# Patient Record
Sex: FEMALE | Race: WHITE | URBAN - METROPOLITAN AREA
[De-identification: names, ages, dates, MRNs, and addresses within clinical notes are randomized per-mention and may not be internally consistent; named-entity substitution may affect disease eponyms.]

---

## 2018-05-26 ENCOUNTER — EMERGENCY (EMERGENCY)
Facility: HOSPITAL | Age: 83
LOS: 1 days | Discharge: HOME | End: 2018-05-26
Attending: EMERGENCY MEDICINE
Payer: COMMERCIAL

## 2018-05-26 VITALS
RESPIRATION RATE: 18 BRPM | SYSTOLIC BLOOD PRESSURE: 120 MMHG | HEART RATE: 96 BPM | TEMPERATURE: 98 F | OXYGEN SATURATION: 98 % | DIASTOLIC BLOOD PRESSURE: 77 MMHG

## 2018-05-26 VITALS
SYSTOLIC BLOOD PRESSURE: 113 MMHG | OXYGEN SATURATION: 98 % | TEMPERATURE: 98 F | RESPIRATION RATE: 17 BRPM | HEART RATE: 94 BPM | WEIGHT: 134.92 LBS | HEIGHT: 62 IN | DIASTOLIC BLOOD PRESSURE: 66 MMHG

## 2018-05-26 LAB
ALBUMIN SERPL ELPH-MCNC: 3.4 G/DL — LOW (ref 3.5–5)
ALP SERPL-CCNC: 125 U/L — HIGH (ref 40–120)
ALT FLD-CCNC: 22 U/L DA — SIGNIFICANT CHANGE UP (ref 10–60)
ANION GAP SERPL CALC-SCNC: 7 MMOL/L — SIGNIFICANT CHANGE UP (ref 5–17)
AST SERPL-CCNC: 39 U/L — SIGNIFICANT CHANGE UP (ref 10–40)
BASE EXCESS BLDV CALC-SCNC: 2.3 MMOL/L — HIGH (ref -2–2)
BASOPHILS # BLD AUTO: 0.2 K/UL — SIGNIFICANT CHANGE UP (ref 0–0.2)
BASOPHILS NFR BLD AUTO: 1.2 % — SIGNIFICANT CHANGE UP (ref 0–2)
BILIRUB SERPL-MCNC: 0.2 MG/DL — SIGNIFICANT CHANGE UP (ref 0.2–1.2)
BUN SERPL-MCNC: 22 MG/DL — HIGH (ref 7–18)
CALCIUM SERPL-MCNC: 9.8 MG/DL — SIGNIFICANT CHANGE UP (ref 8.4–10.5)
CHLORIDE SERPL-SCNC: 104 MMOL/L — SIGNIFICANT CHANGE UP (ref 96–108)
CO2 SERPL-SCNC: 25 MMOL/L — SIGNIFICANT CHANGE UP (ref 22–31)
CREAT SERPL-MCNC: 2.08 MG/DL — HIGH (ref 0.5–1.3)
EOSINOPHIL # BLD AUTO: 0.1 K/UL — SIGNIFICANT CHANGE UP (ref 0–0.5)
EOSINOPHIL NFR BLD AUTO: 0.6 % — SIGNIFICANT CHANGE UP (ref 0–6)
GLUCOSE SERPL-MCNC: 268 MG/DL — HIGH (ref 70–99)
HCO3 BLDV-SCNC: 27 MMOL/L — SIGNIFICANT CHANGE UP (ref 21–29)
HCT VFR BLD CALC: 35.2 % — SIGNIFICANT CHANGE UP (ref 34.5–45)
HGB BLD-MCNC: 11.3 G/DL — LOW (ref 11.5–15.5)
HOROWITZ INDEX BLDV+IHG-RTO: 21 — SIGNIFICANT CHANGE UP
LACTATE SERPL-SCNC: 1.3 MMOL/L — SIGNIFICANT CHANGE UP (ref 0.7–2)
LIDOCAIN IGE QN: 250 U/L — SIGNIFICANT CHANGE UP (ref 73–393)
LYMPHOCYTES # BLD AUTO: 30.3 % — SIGNIFICANT CHANGE UP (ref 13–44)
LYMPHOCYTES # BLD AUTO: 4.1 K/UL — HIGH (ref 1–3.3)
MCHC RBC-ENTMCNC: 28.4 PG — SIGNIFICANT CHANGE UP (ref 27–34)
MCHC RBC-ENTMCNC: 32.2 GM/DL — SIGNIFICANT CHANGE UP (ref 32–36)
MCV RBC AUTO: 88.2 FL — SIGNIFICANT CHANGE UP (ref 80–100)
MONOCYTES # BLD AUTO: 0.9 K/UL — SIGNIFICANT CHANGE UP (ref 0–0.9)
MONOCYTES NFR BLD AUTO: 6.8 % — SIGNIFICANT CHANGE UP (ref 2–14)
NEUTROPHILS # BLD AUTO: 8.3 K/UL — HIGH (ref 1.8–7.4)
NEUTROPHILS NFR BLD AUTO: 61 % — SIGNIFICANT CHANGE UP (ref 43–77)
PCO2 BLDV: 46 MMHG — SIGNIFICANT CHANGE UP (ref 35–50)
PH BLDV: 7.39 — SIGNIFICANT CHANGE UP (ref 7.35–7.45)
PLATELET # BLD AUTO: 140 K/UL — LOW (ref 150–400)
PO2 BLDV: SIGNIFICANT CHANGE UP MMHG (ref 25–45)
POTASSIUM SERPL-MCNC: 5.1 MMOL/L — SIGNIFICANT CHANGE UP (ref 3.5–5.3)
POTASSIUM SERPL-SCNC: 5.1 MMOL/L — SIGNIFICANT CHANGE UP (ref 3.5–5.3)
PROT SERPL-MCNC: 8 G/DL — SIGNIFICANT CHANGE UP (ref 6–8.3)
RBC # BLD: 3.99 M/UL — SIGNIFICANT CHANGE UP (ref 3.8–5.2)
RBC # FLD: 12.8 % — SIGNIFICANT CHANGE UP (ref 10.3–14.5)
SAO2 % BLDV: 61 % — LOW (ref 67–88)
SODIUM SERPL-SCNC: 136 MMOL/L — SIGNIFICANT CHANGE UP (ref 135–145)
WBC # BLD: 13.6 K/UL — HIGH (ref 3.8–10.5)
WBC # FLD AUTO: 13.6 K/UL — HIGH (ref 3.8–10.5)

## 2018-05-26 PROCEDURE — 82803 BLOOD GASES ANY COMBINATION: CPT

## 2018-05-26 PROCEDURE — 83605 ASSAY OF LACTIC ACID: CPT

## 2018-05-26 PROCEDURE — 99284 EMERGENCY DEPT VISIT MOD MDM: CPT

## 2018-05-26 PROCEDURE — 82962 GLUCOSE BLOOD TEST: CPT

## 2018-05-26 PROCEDURE — 85027 COMPLETE CBC AUTOMATED: CPT

## 2018-05-26 PROCEDURE — 83690 ASSAY OF LIPASE: CPT

## 2018-05-26 PROCEDURE — 80053 COMPREHEN METABOLIC PANEL: CPT

## 2018-05-26 RX ORDER — SODIUM CHLORIDE 9 MG/ML
1000 INJECTION INTRAMUSCULAR; INTRAVENOUS; SUBCUTANEOUS ONCE
Qty: 0 | Refills: 0 | Status: DISCONTINUED | OUTPATIENT
Start: 2018-05-26 | End: 2018-05-30

## 2018-05-26 NOTE — ED PROVIDER NOTE - PROGRESS NOTE DETAILS
Discussed with pt results of work up, strict return precautions, and need for follow up.  Pt expressed understanding and agrees with plan.    Well appearing, no anion gap, acidosis, unable to provide urine but no dysuria, son with pt at home able to administer meds, fu with Dr. Willams Discussed with pt results of work up, strict return precautions, and need for follow up.  Pt expressed understanding and agrees with plan.    Well appearing, no anion gap, acidosis, unable to provide urine but no dysuria, son with pt at home able to administer meds, advised importance w compliance, states can fu with Dr. Willams

## 2018-05-26 NOTE — ED PROVIDER NOTE - MEDICAL DECISION MAKING DETAILS
87 y/o F pt with hyperglycemia, decreased PO intake and non compliant with meds. Will obtain labs and reevaluate.

## 2018-05-26 NOTE — ED PROVIDER NOTE - OBJECTIVE STATEMENT
: [id number]  85 y/o F pt with a PMHx of DM, HTN and dementia and no significant PSHx presents to the ED c/o weakness and loss of appetite. Pt's daughter states that yesterday pt's son reported pt's blood glucose level of 500. Pt's daughter reports they tried to give her meds 5x yesterday but pt refused it; she is taking insulin but not the pills.  Pt is on metformin 100 BID and Seroquel. Pt denies vomiting,     diarrhea, chest pain or any other complaints. NKDA. : [id number]  85 y/o F pt with a PMHx of DM, HTN and dementia and no significant PSHx presents to the ED c/o weakness and loss of appetite. Pt's aide states that yesterday pt's son reported pt's blood glucose level of 500. Pt's aide reports they tried to give her meds 5x yesterday but pt refused it; she is taking insulin but not the pills.  Pt is on metformin 100 BID and Seroquel. Pt denies vomiting, diarrhea, chest pain or any other complaints. NKDA.

## 2018-05-26 NOTE — ED ADULT NURSE NOTE - OBJECTIVE STATEMENT
n w/c with family for high sugar and loss of appetite as per family refuse to take Metformin at home, pt vital signs stable,

## 2018-05-26 NOTE — ED ADULT TRIAGE NOTE - CHIEF COMPLAINT QUOTE
on w/c with family for high sugar and loss of appetite as per family refuse to take Metformin at home

## 2019-05-17 ENCOUNTER — EMERGENCY (EMERGENCY)
Facility: HOSPITAL | Age: 84
LOS: 1 days | Discharge: HOME | End: 2019-05-17
Attending: EMERGENCY MEDICINE
Payer: COMMERCIAL

## 2019-05-17 VITALS
SYSTOLIC BLOOD PRESSURE: 137 MMHG | DIASTOLIC BLOOD PRESSURE: 74 MMHG | OXYGEN SATURATION: 99 % | TEMPERATURE: 98 F | RESPIRATION RATE: 18 BRPM | HEART RATE: 81 BPM

## 2019-05-17 VITALS
TEMPERATURE: 99 F | HEART RATE: 77 BPM | OXYGEN SATURATION: 98 % | HEIGHT: 62 IN | DIASTOLIC BLOOD PRESSURE: 78 MMHG | RESPIRATION RATE: 16 BRPM | SYSTOLIC BLOOD PRESSURE: 132 MMHG | WEIGHT: 145.06 LBS

## 2019-05-17 PROCEDURE — 72125 CT NECK SPINE W/O DYE: CPT

## 2019-05-17 PROCEDURE — 70450 CT HEAD/BRAIN W/O DYE: CPT | Mod: 26

## 2019-05-17 PROCEDURE — 99284 EMERGENCY DEPT VISIT MOD MDM: CPT | Mod: 25

## 2019-05-17 PROCEDURE — 74176 CT ABD & PELVIS W/O CONTRAST: CPT

## 2019-05-17 PROCEDURE — 74176 CT ABD & PELVIS W/O CONTRAST: CPT | Mod: 26

## 2019-05-17 PROCEDURE — 71250 CT THORAX DX C-: CPT | Mod: 26

## 2019-05-17 PROCEDURE — 99284 EMERGENCY DEPT VISIT MOD MDM: CPT

## 2019-05-17 PROCEDURE — 72125 CT NECK SPINE W/O DYE: CPT | Mod: 26

## 2019-05-17 PROCEDURE — 96372 THER/PROPH/DIAG INJ SC/IM: CPT

## 2019-05-17 PROCEDURE — 70450 CT HEAD/BRAIN W/O DYE: CPT

## 2019-05-17 PROCEDURE — 71250 CT THORAX DX C-: CPT

## 2019-05-17 RX ORDER — MIDAZOLAM HYDROCHLORIDE 1 MG/ML
2 INJECTION, SOLUTION INTRAMUSCULAR; INTRAVENOUS ONCE
Refills: 0 | Status: DISCONTINUED | OUTPATIENT
Start: 2019-05-17 | End: 2019-05-17

## 2019-05-17 RX ADMIN — MIDAZOLAM HYDROCHLORIDE 2 MILLIGRAM(S): 1 INJECTION, SOLUTION INTRAMUSCULAR; INTRAVENOUS at 13:55

## 2019-05-17 RX ADMIN — MIDAZOLAM HYDROCHLORIDE 2 MILLIGRAM(S): 1 INJECTION, SOLUTION INTRAMUSCULAR; INTRAVENOUS at 14:41

## 2019-05-17 NOTE — ED PROVIDER NOTE - OBJECTIVE STATEMENT
87 F with hx of dementia brought in by family for pain to L side of body after a mechanical fall 2 days ago while getting out of a chair.  No fever/chills.  No change in behavior or mental status.  Patient cannot give further hx 2/2 dementia.

## 2019-05-17 NOTE — ED PROVIDER NOTE - NSFOLLOWUPINSTRUCTIONS_ED_ALL_ED_FT
Follow up with Dr. Willams next week.    -- Please use 650-1000mg Tylenol (also called acetaminophen) every 6 hours every 6 hours as needed for pain/discomfort/swelling. You can get these without a prescription. Don't use more than 3000mg of Tylenol in any 24-hour period. Make sure your other prescription/over-the-counter medications don't contain any Tylenol so you don't take too much.    Seguimiento con el Dr. Sarmiento la próxima semana.    - Use 650-1000mg de Tylenol (también llamado acetaminofeno) cada 6 horas cada 6 horas según sea necesario para el dolor / malestar / hinchazón. Usted puede obtener estos sin receta médica. No use más de 3000 mg de Tylenol en un período de 24 horas. Asegúrese de que stanley otros medicamentos recetados / de venta gaston no contengan Tylenol para que no tome demasiado.

## 2020-04-01 ENCOUNTER — OUTPATIENT (OUTPATIENT)
Dept: OUTPATIENT SERVICES | Facility: HOSPITAL | Age: 85
LOS: 1 days | End: 2020-04-01
Payer: MEDICARE

## 2020-04-01 PROCEDURE — G9001: CPT

## 2020-04-04 ENCOUNTER — INPATIENT (INPATIENT)
Facility: HOSPITAL | Age: 85
LOS: 0 days | Discharge: HOME | DRG: 177 | End: 2020-04-05
Attending: INTERNAL MEDICINE | Admitting: INTERNAL MEDICINE
Payer: COMMERCIAL

## 2020-04-04 VITALS
HEIGHT: 64 IN | WEIGHT: 143.96 LBS | SYSTOLIC BLOOD PRESSURE: 167 MMHG | RESPIRATION RATE: 17 BRPM | DIASTOLIC BLOOD PRESSURE: 78 MMHG | OXYGEN SATURATION: 92 % | TEMPERATURE: 98 F | HEART RATE: 88 BPM

## 2020-04-04 DIAGNOSIS — R73.9 HYPERGLYCEMIA, UNSPECIFIED: ICD-10-CM

## 2020-04-04 LAB
ALBUMIN SERPL ELPH-MCNC: 2.1 G/DL — LOW (ref 3.5–5)
ALBUMIN SERPL ELPH-MCNC: 2.2 G/DL — LOW (ref 3.5–5)
ALP SERPL-CCNC: 84 U/L — SIGNIFICANT CHANGE UP (ref 40–120)
ALP SERPL-CCNC: 86 U/L — SIGNIFICANT CHANGE UP (ref 40–120)
ALT FLD-CCNC: 51 U/L DA — SIGNIFICANT CHANGE UP (ref 10–60)
ALT FLD-CCNC: 56 U/L DA — SIGNIFICANT CHANGE UP (ref 10–60)
ANION GAP SERPL CALC-SCNC: 8 MMOL/L — SIGNIFICANT CHANGE UP (ref 5–17)
ANION GAP SERPL CALC-SCNC: 9 MMOL/L — SIGNIFICANT CHANGE UP (ref 5–17)
APPEARANCE UR: ABNORMAL
AST SERPL-CCNC: 105 U/L — HIGH (ref 10–40)
AST SERPL-CCNC: 121 U/L — HIGH (ref 10–40)
BACTERIA # UR AUTO: ABNORMAL /HPF
BASOPHILS # BLD AUTO: 0.03 K/UL — SIGNIFICANT CHANGE UP (ref 0–0.2)
BASOPHILS NFR BLD AUTO: 0.2 % — SIGNIFICANT CHANGE UP (ref 0–2)
BILIRUB SERPL-MCNC: 0.3 MG/DL — SIGNIFICANT CHANGE UP (ref 0.2–1.2)
BILIRUB SERPL-MCNC: 0.3 MG/DL — SIGNIFICANT CHANGE UP (ref 0.2–1.2)
BILIRUB UR-MCNC: NEGATIVE — SIGNIFICANT CHANGE UP
BUN SERPL-MCNC: 85 MG/DL — HIGH (ref 7–18)
BUN SERPL-MCNC: 92 MG/DL — HIGH (ref 7–18)
CALCIUM SERPL-MCNC: 7.6 MG/DL — LOW (ref 8.4–10.5)
CALCIUM SERPL-MCNC: 7.9 MG/DL — LOW (ref 8.4–10.5)
CHLORIDE SERPL-SCNC: 111 MMOL/L — HIGH (ref 96–108)
CHLORIDE SERPL-SCNC: 115 MMOL/L — HIGH (ref 96–108)
CO2 SERPL-SCNC: 18 MMOL/L — LOW (ref 22–31)
CO2 SERPL-SCNC: 20 MMOL/L — LOW (ref 22–31)
COLOR SPEC: YELLOW — SIGNIFICANT CHANGE UP
CREAT SERPL-MCNC: 4.18 MG/DL — HIGH (ref 0.5–1.3)
CREAT SERPL-MCNC: 4.63 MG/DL — HIGH (ref 0.5–1.3)
DIFF PNL FLD: ABNORMAL
EOSINOPHIL # BLD AUTO: 0 K/UL — SIGNIFICANT CHANGE UP (ref 0–0.5)
EOSINOPHIL NFR BLD AUTO: 0 % — SIGNIFICANT CHANGE UP (ref 0–6)
EPI CELLS # UR: ABNORMAL /HPF
GLUCOSE SERPL-MCNC: 392 MG/DL — HIGH (ref 70–99)
GLUCOSE SERPL-MCNC: 436 MG/DL — HIGH (ref 70–99)
GLUCOSE UR QL: 50 MG/DL
HCT VFR BLD CALC: 31.5 % — LOW (ref 34.5–45)
HCT VFR BLD CALC: 33.3 % — LOW (ref 34.5–45)
HGB BLD-MCNC: 10 G/DL — LOW (ref 11.5–15.5)
HGB BLD-MCNC: 10.4 G/DL — LOW (ref 11.5–15.5)
IMM GRANULOCYTES NFR BLD AUTO: 1.9 % — HIGH (ref 0–1.5)
KETONES UR-MCNC: NEGATIVE — SIGNIFICANT CHANGE UP
LEUKOCYTE ESTERASE UR-ACNC: ABNORMAL
LYMPHOCYTES # BLD AUTO: 1.55 K/UL — SIGNIFICANT CHANGE UP (ref 1–3.3)
LYMPHOCYTES # BLD AUTO: 9.9 % — LOW (ref 13–44)
MCHC RBC-ENTMCNC: 27.2 PG — SIGNIFICANT CHANGE UP (ref 27–34)
MCHC RBC-ENTMCNC: 27.9 PG — SIGNIFICANT CHANGE UP (ref 27–34)
MCHC RBC-ENTMCNC: 31.2 GM/DL — LOW (ref 32–36)
MCHC RBC-ENTMCNC: 31.7 GM/DL — LOW (ref 32–36)
MCV RBC AUTO: 87.2 FL — SIGNIFICANT CHANGE UP (ref 80–100)
MCV RBC AUTO: 88 FL — SIGNIFICANT CHANGE UP (ref 80–100)
MONOCYTES # BLD AUTO: 0.66 K/UL — SIGNIFICANT CHANGE UP (ref 0–0.9)
MONOCYTES NFR BLD AUTO: 4.2 % — SIGNIFICANT CHANGE UP (ref 2–14)
NEUTROPHILS # BLD AUTO: 13.18 K/UL — HIGH (ref 1.8–7.4)
NEUTROPHILS NFR BLD AUTO: 83.8 % — HIGH (ref 43–77)
NITRITE UR-MCNC: NEGATIVE — SIGNIFICANT CHANGE UP
NRBC # BLD: 0 /100 WBCS — SIGNIFICANT CHANGE UP (ref 0–0)
NRBC # BLD: 0 /100 WBCS — SIGNIFICANT CHANGE UP (ref 0–0)
PH UR: 5 — SIGNIFICANT CHANGE UP (ref 5–8)
PLATELET # BLD AUTO: 151 K/UL — SIGNIFICANT CHANGE UP (ref 150–400)
PLATELET # BLD AUTO: 161 K/UL — SIGNIFICANT CHANGE UP (ref 150–400)
POTASSIUM SERPL-MCNC: 5.4 MMOL/L — HIGH (ref 3.5–5.3)
POTASSIUM SERPL-MCNC: 6 MMOL/L — HIGH (ref 3.5–5.3)
POTASSIUM SERPL-SCNC: 5.4 MMOL/L — HIGH (ref 3.5–5.3)
POTASSIUM SERPL-SCNC: 6 MMOL/L — HIGH (ref 3.5–5.3)
PROT SERPL-MCNC: 7.1 G/DL — SIGNIFICANT CHANGE UP (ref 6–8.3)
PROT SERPL-MCNC: 7.9 G/DL — SIGNIFICANT CHANGE UP (ref 6–8.3)
PROT UR-MCNC: 100
RBC # BLD: 3.58 M/UL — LOW (ref 3.8–5.2)
RBC # BLD: 3.82 M/UL — SIGNIFICANT CHANGE UP (ref 3.8–5.2)
RBC # FLD: 14.7 % — HIGH (ref 10.3–14.5)
RBC # FLD: 14.9 % — HIGH (ref 10.3–14.5)
RBC CASTS # UR COMP ASSIST: ABNORMAL /HPF (ref 0–2)
SODIUM SERPL-SCNC: 139 MMOL/L — SIGNIFICANT CHANGE UP (ref 135–145)
SODIUM SERPL-SCNC: 142 MMOL/L — SIGNIFICANT CHANGE UP (ref 135–145)
SP GR SPEC: 1.01 — SIGNIFICANT CHANGE UP (ref 1.01–1.02)
UROBILINOGEN FLD QL: NEGATIVE — SIGNIFICANT CHANGE UP
WBC # BLD: 15.72 K/UL — HIGH (ref 3.8–10.5)
WBC # BLD: 16.53 K/UL — HIGH (ref 3.8–10.5)
WBC # FLD AUTO: 15.72 K/UL — HIGH (ref 3.8–10.5)
WBC # FLD AUTO: 16.53 K/UL — HIGH (ref 3.8–10.5)
WBC UR QL: >50 /HPF (ref 0–5)

## 2020-04-04 PROCEDURE — 99285 EMERGENCY DEPT VISIT HI MDM: CPT

## 2020-04-04 PROCEDURE — 71045 X-RAY EXAM CHEST 1 VIEW: CPT | Mod: 26

## 2020-04-04 RX ORDER — INSULIN LISPRO 100/ML
VIAL (ML) SUBCUTANEOUS
Refills: 0 | Status: DISCONTINUED | OUTPATIENT
Start: 2020-04-04 | End: 2020-04-05

## 2020-04-04 RX ORDER — HEPARIN SODIUM 5000 [USP'U]/ML
5000 INJECTION INTRAVENOUS; SUBCUTANEOUS EVERY 12 HOURS
Refills: 0 | Status: DISCONTINUED | OUTPATIENT
Start: 2020-04-04 | End: 2020-04-05

## 2020-04-04 RX ORDER — SODIUM CHLORIDE 9 MG/ML
1000 INJECTION, SOLUTION INTRAVENOUS
Refills: 0 | Status: DISCONTINUED | OUTPATIENT
Start: 2020-04-04 | End: 2020-04-05

## 2020-04-04 RX ORDER — DEXTROSE 50 % IN WATER 50 %
25 SYRINGE (ML) INTRAVENOUS ONCE
Refills: 0 | Status: DISCONTINUED | OUTPATIENT
Start: 2020-04-04 | End: 2020-04-05

## 2020-04-04 RX ORDER — SODIUM CHLORIDE 9 MG/ML
1000 INJECTION INTRAMUSCULAR; INTRAVENOUS; SUBCUTANEOUS
Refills: 0 | Status: DISCONTINUED | OUTPATIENT
Start: 2020-04-04 | End: 2020-04-05

## 2020-04-04 RX ORDER — DEXTROSE 50 % IN WATER 50 %
15 SYRINGE (ML) INTRAVENOUS ONCE
Refills: 0 | Status: DISCONTINUED | OUTPATIENT
Start: 2020-04-04 | End: 2020-04-05

## 2020-04-04 RX ORDER — INSULIN GLARGINE 100 [IU]/ML
10 INJECTION, SOLUTION SUBCUTANEOUS AT BEDTIME
Refills: 0 | Status: DISCONTINUED | OUTPATIENT
Start: 2020-04-04 | End: 2020-04-05

## 2020-04-04 RX ORDER — GLUCAGON INJECTION, SOLUTION 0.5 MG/.1ML
1 INJECTION, SOLUTION SUBCUTANEOUS ONCE
Refills: 0 | Status: DISCONTINUED | OUTPATIENT
Start: 2020-04-04 | End: 2020-04-05

## 2020-04-04 RX ORDER — DEXTROSE 50 % IN WATER 50 %
12.5 SYRINGE (ML) INTRAVENOUS ONCE
Refills: 0 | Status: DISCONTINUED | OUTPATIENT
Start: 2020-04-04 | End: 2020-04-05

## 2020-04-04 RX ORDER — SODIUM CHLORIDE 9 MG/ML
500 INJECTION INTRAMUSCULAR; INTRAVENOUS; SUBCUTANEOUS ONCE
Refills: 0 | Status: COMPLETED | OUTPATIENT
Start: 2020-04-04 | End: 2020-04-04

## 2020-04-04 RX ADMIN — SODIUM CHLORIDE 500 MILLILITER(S): 9 INJECTION INTRAMUSCULAR; INTRAVENOUS; SUBCUTANEOUS at 17:15

## 2020-04-04 NOTE — ED PROVIDER NOTE - CARE PLAN
Principal Discharge DX:	Hyperglycemia Principal Discharge DX:	Hyperglycemia  Secondary Diagnosis:	Dehydration

## 2020-04-04 NOTE — ED PROVIDER NOTE - OBJECTIVE STATEMENT
pt is a 87 y/o female with cc of hyperglycemia.  pt brought in by son.  son states that he checked patients sugar this am and was normal this mornning.  this afternoon he checked it again and it was high.  pt does have diabetes, dementia, htn, chol.   pt does take junuvia.  pt with no change in medication recently. no sickness.  Patient has been eating but less.  Patient with no fever, no chills, no cough, no cp, no sob. no n/v/d/c.  Patient lives with son. pt did walk into the ED.  son states that the glucose was tested after lunch at home.  Patient is otherwise well with no complaints.

## 2020-04-04 NOTE — H&P ADULT - ASSESSMENT
Pt is and 89 y/o F, non verbal, mostly bedbound, from home, with PMH  of Dementia, DM and HTN was brought by son for hyperglycemia, generalized weakness and anorexia.  Pt is admitted for uncontrolled diabetes, michelle and viral pneumonia. Suspected COVID-19

## 2020-04-04 NOTE — ED PROVIDER NOTE - CLINICAL SUMMARY MEDICAL DECISION MAKING FREE TEXT BOX
pt is well appearing and in no acute distresss.  pt with no fver, no chills, eating, and appears hydrated.   will check basic labs and give IVF.  if labs are well will plan on discharge home.

## 2020-04-04 NOTE — H&P ADULT - PROBLEM SELECTOR PLAN 3
Cr/ BUN: 4.18/[birth year]  Likely MARGO on CKD  Monitor BMP avoid nephrotoxic medications  F/u Urine electrolytes Cr/ BUN: 4.18/85  Likely MARGO on CKD  Monitor BMP avoid nephrotoxic medications  F/u Urine electrolytes  Nephrology Pagan consulted

## 2020-04-04 NOTE — ED PROVIDER NOTE - PROGRESS NOTE DETAILS
spoke to dr. Willams. will admit.   night team will follow up on cxr/ekg.   pt is stable in NAD. Son's number is ;  mitchell

## 2020-04-04 NOTE — H&P ADULT - HISTORY OF PRESENT ILLNESS
Pt is and 87 y/o F, non verbal, mostly bedbound, from home, with PMH  of Dementia, DM and HTN was brought by son for hyperglycemia, generalized weakness and anorexia. According to the son, since 5 days, she has become weak and has not been eating well. He mentioned that today afternoon he measured her blood glucose which was more than 400 and he decided to bring her to hospital. As per son pt was not coughing, short of breath and did not have nausea, vomiting and other complains. He stated that pt used to walk with assistance before few weeks but recently has become bedbound and unable to walk with assistance.

## 2020-04-04 NOTE — ED ADULT NURSE NOTE - OBJECTIVE STATEMENT
Patient present to ED BIBA for elevated blood glucose as per family in 400"s apon arrival. Patient awake responsive but confused.

## 2020-04-04 NOTE — ED ADULT NURSE NOTE - NSIMPLEMENTINTERV_GEN_ALL_ED
Implemented All Fall with Harm Risk Interventions:  [place] to call system. Call bell, personal items and telephone within reach. Instruct patient to call for assistance. Room bathroom lighting operational. Non-slip footwear when patient is off stretcher. Physically safe environment: no spills, clutter or unnecessary equipment. Stretcher in lowest position, wheels locked, appropriate side rails in place. Provide visual cue, wrist band, yellow gown, etc. Monitor gait and stability. Monitor for mental status changes and reorient to person, place, and time. Review medications for side effects contributing to fall risk. Reinforce activity limits and safety measures with patient and family. Provide visual clues: red socks.

## 2020-04-04 NOTE — H&P ADULT - PROBLEM SELECTOR PLAN 2
Blood glucose: 457 on admission  pt 's son said he will bring her home meds at am. please confirm home meds. P  Likely secondary to infection and dehydration  Continue sliding scale Humalog  Monitor blood glucose  F/u HBA1C

## 2020-04-04 NOTE — H&P ADULT - NSHPLABSRESULTS_GEN_ALL_CORE
10.0   16.53 )-----------( 151      ( 04 Apr 2020 19:57 )             31.5     04-04    142  |  115<H>  |  85<H>  ----------------------------<  392<H>  5.4<H>   |  18<L>  |  4.18<H>    Ca    7.6<L>      04 Apr 2020 19:57    TPro  7.1  /  Alb  2.1<L>  /  TBili  0.3  /  DBili  x   /  AST  105<H>  /  ALT  51  /  AlkPhos  84  04-04

## 2020-04-04 NOTE — H&P ADULT - PROBLEM SELECTOR PLAN 1
Chest x-ray showed  Bilateral infiltrate  Suspected COVID-19  Pt saturating 92% on room air  Continue Ceftriaxone and azithromycin  Continue thiamin, vitamin C,   F/u blood culture  F/u CPR, D-dimer, LDH, ferritin, procalcitonin

## 2020-04-05 ENCOUNTER — INPATIENT (INPATIENT)
Facility: HOSPITAL | Age: 85
LOS: 4 days | Discharge: HOME | End: 2020-04-10
Attending: FAMILY MEDICINE
Payer: MEDICARE

## 2020-04-05 ENCOUNTER — OUTPATIENT (OUTPATIENT)
Dept: OUTPATIENT SERVICES | Facility: HOSPITAL | Age: 85
LOS: 1 days | End: 2020-04-05

## 2020-04-05 VITALS
DIASTOLIC BLOOD PRESSURE: 68 MMHG | TEMPERATURE: 99 F | OXYGEN SATURATION: 95 % | SYSTOLIC BLOOD PRESSURE: 158 MMHG | RESPIRATION RATE: 18 BRPM | HEART RATE: 92 BPM

## 2020-04-05 DIAGNOSIS — J18.9 PNEUMONIA, UNSPECIFIED ORGANISM: ICD-10-CM

## 2020-04-05 DIAGNOSIS — E11.65 TYPE 2 DIABETES MELLITUS WITH HYPERGLYCEMIA: ICD-10-CM

## 2020-04-05 DIAGNOSIS — E86.0 DEHYDRATION: ICD-10-CM

## 2020-04-05 DIAGNOSIS — J12.9 VIRAL PNEUMONIA, UNSPECIFIED: ICD-10-CM

## 2020-04-05 DIAGNOSIS — Z29.9 ENCOUNTER FOR PROPHYLACTIC MEASURES, UNSPECIFIED: ICD-10-CM

## 2020-04-05 DIAGNOSIS — N17.9 ACUTE KIDNEY FAILURE, UNSPECIFIED: ICD-10-CM

## 2020-04-05 LAB
24R-OH-CALCIDIOL SERPL-MCNC: 37.8 NG/ML — SIGNIFICANT CHANGE UP (ref 30–80)
ALBUMIN SERPL ELPH-MCNC: 2.2 G/DL — LOW (ref 3.5–5)
ALP SERPL-CCNC: 93 U/L — SIGNIFICANT CHANGE UP (ref 40–120)
ALT FLD-CCNC: 51 U/L DA — SIGNIFICANT CHANGE UP (ref 10–60)
ANION GAP SERPL CALC-SCNC: 7 MMOL/L — SIGNIFICANT CHANGE UP (ref 5–17)
AST SERPL-CCNC: 91 U/L — HIGH (ref 10–40)
BILIRUB SERPL-MCNC: 0.2 MG/DL — SIGNIFICANT CHANGE UP (ref 0.2–1.2)
BUN SERPL-MCNC: 72 MG/DL — HIGH (ref 7–18)
CALCIUM SERPL-MCNC: 8.7 MG/DL — SIGNIFICANT CHANGE UP (ref 8.4–10.5)
CHLORIDE SERPL-SCNC: 118 MMOL/L — HIGH (ref 96–108)
CHOLEST SERPL-MCNC: 93 MG/DL — SIGNIFICANT CHANGE UP (ref 10–199)
CO2 SERPL-SCNC: 23 MMOL/L — SIGNIFICANT CHANGE UP (ref 22–31)
CREAT SERPL-MCNC: 3.11 MG/DL — HIGH (ref 0.5–1.3)
CRP SERPL-MCNC: 13.39 MG/DL — HIGH (ref 0–0.4)
D DIMER BLD IA.RAPID-MCNC: 6415 NG/ML DDU — HIGH
GLUCOSE BLDC GLUCOMTR-MCNC: 111 MG/DL — HIGH (ref 70–99)
GLUCOSE BLDC GLUCOMTR-MCNC: 241 MG/DL — HIGH (ref 70–99)
GLUCOSE BLDC GLUCOMTR-MCNC: 321 MG/DL — HIGH (ref 70–99)
GLUCOSE SERPL-MCNC: 191 MG/DL — HIGH (ref 70–99)
HBA1C BLD-MCNC: 8.6 % — HIGH (ref 4–5.6)
HCT VFR BLD CALC: 34.6 % — SIGNIFICANT CHANGE UP (ref 34.5–45)
HDLC SERPL-MCNC: 27 MG/DL — LOW
HGB BLD-MCNC: 10.9 G/DL — LOW (ref 11.5–15.5)
LDH SERPL L TO P-CCNC: 469 U/L — HIGH (ref 120–225)
LIPID PNL WITH DIRECT LDL SERPL: 22 MG/DL — SIGNIFICANT CHANGE UP
MAGNESIUM SERPL-MCNC: 3.7 MG/DL — HIGH (ref 1.6–2.6)
MCHC RBC-ENTMCNC: 27.9 PG — SIGNIFICANT CHANGE UP (ref 27–34)
MCHC RBC-ENTMCNC: 31.5 GM/DL — LOW (ref 32–36)
MCV RBC AUTO: 88.5 FL — SIGNIFICANT CHANGE UP (ref 80–100)
NRBC # BLD: 0 /100 WBCS — SIGNIFICANT CHANGE UP (ref 0–0)
PHOSPHATE SERPL-MCNC: 3.7 MG/DL — SIGNIFICANT CHANGE UP (ref 2.5–4.5)
PLATELET # BLD AUTO: 175 K/UL — SIGNIFICANT CHANGE UP (ref 150–400)
POTASSIUM SERPL-MCNC: 4.7 MMOL/L — SIGNIFICANT CHANGE UP (ref 3.5–5.3)
POTASSIUM SERPL-SCNC: 4.7 MMOL/L — SIGNIFICANT CHANGE UP (ref 3.5–5.3)
PROCALCITONIN SERPL-MCNC: 1.42 NG/ML — HIGH (ref 0.02–0.1)
PROT SERPL-MCNC: 7.9 G/DL — SIGNIFICANT CHANGE UP (ref 6–8.3)
RBC # BLD: 3.91 M/UL — SIGNIFICANT CHANGE UP (ref 3.8–5.2)
RBC # FLD: 14.9 % — HIGH (ref 10.3–14.5)
SODIUM SERPL-SCNC: 148 MMOL/L — HIGH (ref 135–145)
TOTAL CHOLESTEROL/HDL RATIO MEASUREMENT: 3.4 RATIO — SIGNIFICANT CHANGE UP (ref 3.3–7.1)
TRIGL SERPL-MCNC: 220 MG/DL — HIGH (ref 10–149)
TSH SERPL-MCNC: 0.4 UU/ML — SIGNIFICANT CHANGE UP (ref 0.34–4.82)
VIT B12 SERPL-MCNC: 1660 PG/ML — HIGH (ref 232–1245)
WBC # BLD: 15.77 K/UL — HIGH (ref 3.8–10.5)
WBC # FLD AUTO: 15.77 K/UL — HIGH (ref 3.8–10.5)

## 2020-04-05 PROCEDURE — 71045 X-RAY EXAM CHEST 1 VIEW: CPT | Mod: 26

## 2020-04-05 PROCEDURE — 99285 EMERGENCY DEPT VISIT HI MDM: CPT

## 2020-04-05 RX ORDER — CEFTRIAXONE 500 MG/1
1000 INJECTION, POWDER, FOR SOLUTION INTRAMUSCULAR; INTRAVENOUS EVERY 24 HOURS
Refills: 0 | Status: DISCONTINUED | OUTPATIENT
Start: 2020-04-05 | End: 2020-04-07

## 2020-04-05 RX ORDER — HYDROXYCHLOROQUINE SULFATE 200 MG
TABLET ORAL
Refills: 0 | Status: DISCONTINUED | OUTPATIENT
Start: 2020-04-05 | End: 2020-04-05

## 2020-04-05 RX ORDER — HYDROXYCHLOROQUINE SULFATE 200 MG
200 TABLET ORAL EVERY 12 HOURS
Refills: 0 | Status: DISCONTINUED | OUTPATIENT
Start: 2020-04-06 | End: 2020-04-05

## 2020-04-05 RX ORDER — THIAMINE MONONITRATE (VIT B1) 100 MG
200 TABLET ORAL DAILY
Refills: 0 | Status: DISCONTINUED | OUTPATIENT
Start: 2020-04-05 | End: 2020-04-06

## 2020-04-05 RX ORDER — AZITHROMYCIN 500 MG/1
250 TABLET, FILM COATED ORAL DAILY
Refills: 0 | Status: DISCONTINUED | OUTPATIENT
Start: 2020-04-06 | End: 2020-04-07

## 2020-04-05 RX ORDER — SODIUM POLYSTYRENE SULFONATE 4.1 MEQ/G
15 POWDER, FOR SUSPENSION ORAL ONCE
Refills: 0 | Status: COMPLETED | OUTPATIENT
Start: 2020-04-05 | End: 2020-04-05

## 2020-04-05 RX ORDER — AZITHROMYCIN 500 MG/1
500 TABLET, FILM COATED ORAL ONCE
Refills: 0 | Status: DISCONTINUED | OUTPATIENT
Start: 2020-04-05 | End: 2020-04-07

## 2020-04-05 RX ORDER — HYDROXYCHLOROQUINE SULFATE 200 MG
400 TABLET ORAL EVERY 12 HOURS
Refills: 0 | Status: DISCONTINUED | OUTPATIENT
Start: 2020-04-05 | End: 2020-04-05

## 2020-04-05 RX ORDER — ASCORBIC ACID 60 MG
1000 TABLET,CHEWABLE ORAL
Refills: 0 | Status: DISCONTINUED | OUTPATIENT
Start: 2020-04-05 | End: 2020-04-06

## 2020-04-05 RX ORDER — SODIUM CHLORIDE 9 MG/ML
1000 INJECTION, SOLUTION INTRAVENOUS
Refills: 0 | Status: DISCONTINUED | OUTPATIENT
Start: 2020-04-05 | End: 2020-04-05

## 2020-04-05 RX ORDER — INSULIN GLARGINE 100 [IU]/ML
10 INJECTION, SOLUTION SUBCUTANEOUS AT BEDTIME
Refills: 0 | Status: DISCONTINUED | OUTPATIENT
Start: 2020-04-05 | End: 2020-04-05

## 2020-04-05 RX ORDER — INSULIN LISPRO 100/ML
5 VIAL (ML) SUBCUTANEOUS
Refills: 0 | Status: DISCONTINUED | OUTPATIENT
Start: 2020-04-05 | End: 2020-04-05

## 2020-04-05 RX ADMIN — INSULIN GLARGINE 10 UNIT(S): 100 INJECTION, SOLUTION SUBCUTANEOUS at 04:23

## 2020-04-05 RX ADMIN — CEFTRIAXONE 100 MILLIGRAM(S): 500 INJECTION, POWDER, FOR SOLUTION INTRAMUSCULAR; INTRAVENOUS at 09:10

## 2020-04-05 RX ADMIN — Medication 400 MILLIGRAM(S): at 10:24

## 2020-04-05 RX ADMIN — SODIUM CHLORIDE 60 MILLILITER(S): 9 INJECTION, SOLUTION INTRAVENOUS at 16:15

## 2020-04-05 RX ADMIN — SODIUM CHLORIDE 90 MILLILITER(S): 9 INJECTION INTRAMUSCULAR; INTRAVENOUS; SUBCUTANEOUS at 04:36

## 2020-04-05 RX ADMIN — HEPARIN SODIUM 5000 UNIT(S): 5000 INJECTION INTRAVENOUS; SUBCUTANEOUS at 04:42

## 2020-04-05 RX ADMIN — Medication 1000 MILLIGRAM(S): at 11:44

## 2020-04-05 RX ADMIN — Medication 8: at 08:19

## 2020-04-05 RX ADMIN — SODIUM POLYSTYRENE SULFONATE 15 GRAM(S): 4.1 POWDER, FOR SUSPENSION ORAL at 10:24

## 2020-04-05 RX ADMIN — Medication 4: at 11:51

## 2020-04-05 NOTE — CONSULT NOTE ADULT - SUBJECTIVE AND OBJECTIVE BOX
Chief complain/HPI  t is and 89 y/o F, non verbal, mostly bedbound, from home, with PMH  of Dementia, DM and HTN was brought by son for hyperglycemia, generalized weakness and anorexia. According to the son, since 5 days, she has become weak and has not been eating well. He mentioned that today afternoon he measured her blood glucose which was more than 400 and he decided to bring her to hospital. As per son pt was not coughing, short of breath and did not have nausea, vomiting and other complains. He stated that pt used to walk with assistance before few weeks but recently has become bedbound and unable to walk with assistance.     Creatinine, Serum: 2.08 mg/dL (18 @ 16:31)    Renal consult was called for MARGO.      PAST MEDICAL & SURGICAL HISTORY:  HTN (hypertension)  Diabetes mellitus  Dementia  No significant past surgical history      Home Medications Reviewed    Hospital Medications:   MEDICATIONS  (STANDING):  ascorbic acid 1000 milliGRAM(s) Oral four times a day  azithromycin   Tablet 500 milliGRAM(s) Oral once  cefTRIAXone   IVPB 1000 milliGRAM(s) IV Intermittent every 24 hours  dextrose 5%. 1000 milliLiter(s) (50 mL/Hr) IV Continuous <Continuous>  dextrose 50% Injectable 12.5 Gram(s) IV Push once  dextrose 50% Injectable 25 Gram(s) IV Push once  dextrose 50% Injectable 25 Gram(s) IV Push once  heparin  Injectable 5000 Unit(s) SubCutaneous every 12 hours  hydroxychloroquine 400 milliGRAM(s) Oral every 12 hours  hydroxychloroquine   Oral   insulin glargine Injectable (LANTUS) 10 Unit(s) SubCutaneous at bedtime  insulin lispro (HumaLOG) corrective regimen sliding scale   SubCutaneous three times a day before meals  sodium chloride 0.9%. 1000 milliLiter(s) (90 mL/Hr) IV Continuous <Continuous>  sodium polystyrene sulfonate Suspension 15 Gram(s) Oral once  thiamine 200 milliGRAM(s) Oral daily    MEDICATIONS  (PRN):  dextrose 40% Gel 15 Gram(s) Oral once PRN Blood Glucose LESS THAN 70 milliGRAM(s)/deciliter  glucagon  Injectable 1 milliGRAM(s) IntraMuscular once PRN Glucose LESS THAN 70 milligrams/deciliter      Allergies    No Known Allergies    Intolerances                              10.0   16.53 )-----------( 151      ( 2020 19:57 )             31.5     04-04    142  |  115<H>  |  85<H>  ----------------------------<  392<H>  5.4<H>   |  18<L>  |  4.18<H>    Ca    7.6<L>      2020 19:57    TPro  7.1  /  Alb  2.1<L>  /  TBili  0.3  /  DBili  x   /  AST  105<H>  /  ALT  51  /  AlkPhos  84        Urinalysis Basic - ( 2020 17:24 )    Color: Yellow / Appearance: Slightly Turbid / S.015 / pH: x  Gluc: x / Ketone: Negative  / Bili: Negative / Urobili: Negative   Blood: x / Protein: 100 / Nitrite: Negative   Leuk Esterase: Moderate / RBC: 5-10 /HPF / WBC >50 /HPF   Sq Epi: x / Non Sq Epi: Occasional /HPF / Bacteria: Many /HPF            RADIOLOGY & ADDITIONAL STUDIES:  NTERPRETATION:  Frontal chest on  at 2:10 PM. Patient has   abdominal pain. Heart size is within normal limits. The aorta is slightly   uncoiled.    There is slight linear atelectasis seen at left base.    This is more evident than on 2016.    IMPRESSION: Slight left base atelectasis.                 DIGNA MARLOW M.D., ATTENDING RADIOLOGIST  This document has been electronically signed. Jul  SOCIAL HISTORY: Denies ETOh,Smoking,     FAMILY HISTORY:  No pertinent family history in first degree relatives      REVIEW OF SYSTEMS:  CONFUSED\  VITALS:  Vital Signs Last 24 Hrs  T(C): 37.1 (2020 08:37), Max: 37.1 (2020 08:37)  T(F): 98.8 (2020 08:37), Max: 98.8 (2020 08:37)  HR: 92 (2020 08:37) (88 - 102)  BP: 153/70 (2020 08:37) (143/46 - 187/73)  BP(mean): --  RR: 16 (2020 08:37) (16 - 18)  SpO2: 95% (2020 08:37) (92% - 100%)    Height (cm): 162.56 ( @ 15:18)  Weight (kg): 65.3 ( @ 15:18)  BMI (kg/m2): 24.7 ( @ 15:18)  BSA (m2): 1.7 ( @ 15:18)    PHYSICAL EXAM:  Constitutional: NAD, SUPINE  Respiratory: air entrance B/L, no wheezes, rales or rhonchi  Cardiovascular: S1, S2, RRR, no pericardial rub, no murmur  Gastrointestinal: BS+, soft, no tenderness, no distension, no bruit  Pelvis: bladder non-distended, no CVA tenderness  Extremities: No cyanosis or clubbing. No peripheral edema

## 2020-04-05 NOTE — PROGRESS NOTE ADULT - SUBJECTIVE AND OBJECTIVE BOX
Pt is and 89 y/o F, non verbal, mostly bedbound, from home, with PMH  of Dementia, DM and HTN was brought by son for hyperglycemia, generalized weakness and anorexia. According to the son, since 5 days, she has become weak and has not been eating well. He mentioned that today afternoon he measured her blood glucose which was more than 400 and he decided to bring her to hospital. As per son pt was not coughing, short of breath and did not have nausea, vomiting and other complains. He stated that pt used to walk with assistance before few weeks but recently has become bedbound and unable to walk with assistance.     Review of Systems:  · Additional ROS	Unable to access ROS from the pt as pt is non verbal.	      pt seen in ed [x  ], reg med floor [   ], bed [ x ], chair at bedside [   ], awake and responsive [x  ], lethargic [  ],  nad [x  ]        Allergies    No Known Allergies        Vitals    T(F): 97.9 (04-05-20 @ 05:13), Max: 98.5 (04-04-20 @ 21:27)  HR: 92 (04-05-20 @ 05:13) (88 - 102)  BP: 143/46 (04-05-20 @ 05:13) (143/46 - 187/73)  RR: 18 (04-05-20 @ 05:13) (17 - 18)  SpO2: 100% (04-05-20 @ 05:13) (92% - 100%)  Wt(kg): --  CAPILLARY BLOOD GLUCOSE      POCT Blood Glucose.: 407 mg/dL (04 Apr 2020 19:06)      Labs                          10.0   16.53 )-----------( 151      ( 04 Apr 2020 19:57 )             31.5       04-04    142  |  115<H>  |  85<H>  ----------------------------<  392<H>  5.4<H>   |  18<L>  |  4.18<H>    Ca    7.6<L>      04 Apr 2020 19:57    TPro  7.1  /  Alb  2.1<L>  /  TBili  0.3  /  DBili  x   /  AST  105<H>  /  ALT  51  /  AlkPhos  84  04-04                Radiology Results    < from: Xray Chest 1 View- PORTABLE-Urgent (04.04.20 @ 21:26) >  Single frontal portable radiograph of the chest.    The cardiac silhouette and mediastinum are within normal limits.    There is a patchy opacity of the right mid and right lower lung field likely a mixture of airspace disease and interstitial infiltrates. Mild interstitial prominence of the left lung base. Negative for pneumothorax or significant pleural effusion. Bony thorax is grossly intact.    IMPRESSION: Right greater than left pulmonary infiltrates.    < end of copied text >        Meds    MEDICATIONS  (STANDING):  ascorbic acid 1000 milliGRAM(s) Oral four times a day  azithromycin   Tablet 500 milliGRAM(s) Oral once  cefTRIAXone   IVPB 1000 milliGRAM(s) IV Intermittent every 24 hours  dextrose 5%. 1000 milliLiter(s) (50 mL/Hr) IV Continuous <Continuous>  dextrose 50% Injectable 12.5 Gram(s) IV Push once  dextrose 50% Injectable 25 Gram(s) IV Push once  dextrose 50% Injectable 25 Gram(s) IV Push once  heparin  Injectable 5000 Unit(s) SubCutaneous every 12 hours  hydroxychloroquine 400 milliGRAM(s) Oral every 12 hours  hydroxychloroquine   Oral   insulin glargine Injectable (LANTUS) 10 Unit(s) SubCutaneous at bedtime  insulin lispro (HumaLOG) corrective regimen sliding scale   SubCutaneous three times a day before meals  sodium chloride 0.9%. 1000 milliLiter(s) (90 mL/Hr) IV Continuous <Continuous>  thiamine 200 milliGRAM(s) Oral daily      MEDICATIONS  (PRN):  dextrose 40% Gel 15 Gram(s) Oral once PRN Blood Glucose LESS THAN 70 milliGRAM(s)/deciliter  glucagon  Injectable 1 milliGRAM(s) IntraMuscular once PRN Glucose LESS THAN 70 milligrams/deciliter      Physical Exam    Neuro :  no focal deficits  Respiratory: CTA B/L  CV: RRR, S1S2, no murmurs,   Abdominal: Soft, NT, ND +BS,  Extremities: No edema, + peripheral pulses    ASSESSMENT    Hyperglycemia 2nd to uncontrolled dm  uti  b/l pna   r/o covid-19  h/o HTN (hypertension)  Diabetes mellitus  Dementia        PLAN        cont lantus  add humalog 5units actid  cont hss  f/u hgba1c  f/u ucx  cont roceph and zith  cxr with Right greater than left pulmonary infiltrates noted above  pulm cons   cont albuterol inhaler  start plaquenil   afebrile,   tmx 98.5   O2 sat on ra 100% ( range 92% - 100%)   O2 via nasal canula if needed  f/u covid test  contact and airborne isolation  f/u procalcitonin, legionella Ag, strep, mycoplasma Ag  F/u aptt, inr, D-dimer, esr, crp, ldh, ferritin, lactate, t cell subset  cont supportive care with tylenol prn, robitussin prn   cont current meds Pt is and 89 y/o F, non verbal, mostly bedbound, from home, with PMH  of Dementia, DM and HTN was brought by son for hyperglycemia, generalized weakness and anorexia. According to the son, since 5 days, she has become weak and has not been eating well. He mentioned that today afternoon he measured her blood glucose which was more than 400 and he decided to bring her to hospital. As per son pt was not coughing, short of breath and did not have nausea, vomiting and other complains. He stated that pt used to walk with assistance before few weeks but recently has become bedbound and unable to walk with assistance.     Review of Systems:  · Additional ROS	Unable to access ROS from the pt as pt is non verbal.	      pt seen in ed [x  ], reg med floor [   ], bed [ x ], chair at bedside [   ], awake and responsive [x  ], lethargic [  ],  nad [x  ]        Allergies    No Known Allergies        Vitals    T(F): 97.9 (04-05-20 @ 05:13), Max: 98.5 (04-04-20 @ 21:27)  HR: 92 (04-05-20 @ 05:13) (88 - 102)  BP: 143/46 (04-05-20 @ 05:13) (143/46 - 187/73)  RR: 18 (04-05-20 @ 05:13) (17 - 18)  SpO2: 100% (04-05-20 @ 05:13) (92% - 100%)  Wt(kg): --  CAPILLARY BLOOD GLUCOSE      POCT Blood Glucose.: 407 mg/dL (04 Apr 2020 19:06)      Labs                          10.0   16.53 )-----------( 151      ( 04 Apr 2020 19:57 )             31.5       04-04    142  |  115<H>  |  85<H>  ----------------------------<  392<H>  5.4<H>   |  18<L>  |  4.18<H>    Ca    7.6<L>      04 Apr 2020 19:57    TPro  7.1  /  Alb  2.1<L>  /  TBili  0.3  /  DBili  x   /  AST  105<H>  /  ALT  51  /  AlkPhos  84  04-04                Radiology Results    < from: Xray Chest 1 View- PORTABLE-Urgent (04.04.20 @ 21:26) >  Single frontal portable radiograph of the chest.    The cardiac silhouette and mediastinum are within normal limits.    There is a patchy opacity of the right mid and right lower lung field likely a mixture of airspace disease and interstitial infiltrates. Mild interstitial prominence of the left lung base. Negative for pneumothorax or significant pleural effusion. Bony thorax is grossly intact.    IMPRESSION: Right greater than left pulmonary infiltrates.    < end of copied text >        Meds    MEDICATIONS  (STANDING):  ascorbic acid 1000 milliGRAM(s) Oral four times a day  azithromycin   Tablet 500 milliGRAM(s) Oral once  cefTRIAXone   IVPB 1000 milliGRAM(s) IV Intermittent every 24 hours  dextrose 5%. 1000 milliLiter(s) (50 mL/Hr) IV Continuous <Continuous>  dextrose 50% Injectable 12.5 Gram(s) IV Push once  dextrose 50% Injectable 25 Gram(s) IV Push once  dextrose 50% Injectable 25 Gram(s) IV Push once  heparin  Injectable 5000 Unit(s) SubCutaneous every 12 hours  hydroxychloroquine 400 milliGRAM(s) Oral every 12 hours  hydroxychloroquine   Oral   insulin glargine Injectable (LANTUS) 10 Unit(s) SubCutaneous at bedtime  insulin lispro (HumaLOG) corrective regimen sliding scale   SubCutaneous three times a day before meals  sodium chloride 0.9%. 1000 milliLiter(s) (90 mL/Hr) IV Continuous <Continuous>  thiamine 200 milliGRAM(s) Oral daily      MEDICATIONS  (PRN):  dextrose 40% Gel 15 Gram(s) Oral once PRN Blood Glucose LESS THAN 70 milliGRAM(s)/deciliter  glucagon  Injectable 1 milliGRAM(s) IntraMuscular once PRN Glucose LESS THAN 70 milligrams/deciliter      Physical Exam    Neuro :  no focal deficits  Respiratory: CTA B/L  CV: RRR, S1S2, no murmurs,   Abdominal: Soft, NT, ND +BS,  Extremities: No edema, + peripheral pulses    ASSESSMENT    Hyperglycemia 2nd to uncontrolled dm  uti  b/l pna   r/o covid-19   acute on ckd  h/o HTN (hypertension)  Diabetes mellitus  Dementia  ckd      PLAN        cont lantus  add humalog 5units actid  cont hss  f/u hgba1c  f/u ucx  cont roceph and zith  cxr with Right greater than left pulmonary infiltrates noted above  pulm cons   cont albuterol inhaler  start plaquenil   afebrile,   tmx 98.5   O2 sat on ra 100% ( range 92% - 100%)   O2 via nasal canula if needed  f/u covid test  contact and airborne isolation  f/u procalcitonin, legionella Ag, strep, mycoplasma Ag  F/u aptt, inr, D-dimer, esr, crp, ldh, ferritin, lactate, t cell subset  cont supportive care with tylenol prn, robitussin prn   kayexalate for hyperkalemia  renal cons  cont ivf  cont current meds

## 2020-04-05 NOTE — CONSULT NOTE ADULT - ASSESSMENT
Pt is and 87 y/o F, non verbal, mostly bedbound, from home, with PMH  of Dementia, DM and HTN was brought by son for hyperglycemia, generalized weakness and anorexia.  Pt is admitted for uncontrolled diabetes, michelle and viral pneumonia. Suspected COVID-19

## 2020-04-05 NOTE — CHART NOTE - NSCHARTNOTEFT_GEN_A_CORE
NP attempted to call ER contact-Dillon richardson/ the assistance of -[facility] # [phone] but no number listed.  Called pt's home but number not in service.

## 2020-04-05 NOTE — CONSULT NOTE ADULT - PROBLEM SELECTOR RECOMMENDATION 9
Likely secondary to COVID-19 infection  COVID-19 PCR  Isolation precaution   Oxygen supp  Antibiotics  Panculture   F/u CXR  supportive care   LDH ,Ferritin , LFTs , procalcitonin and CRP   Vitamin C , D , B12 and zinc replacement   ID consult

## 2020-04-05 NOTE — ACUTE INTERFACILITY TRANSFER NOTE - PLAN OF CARE
Resolution : Chest x-ray showed  Bilateral infiltrate  Suspected COVID-19  Pt saturating 92% on room air  Continue Ceftriaxone and azithromycin  Continue thiamin  F/u blood culture  F/u CPR, D-dimer, LDH, ferritin, procalcitonin. Maintain normal blood glucose 457 on admission  pt 's son said he will bring her home meds at am. please confirm home meds. P  Likely secondary to infection and dehydration  Continue sliding scale Humalog  Monitor blood glucose  F/u HBA1C. Improvement in renal function Likely MARGO on CKD  Monitor BMP avoid nephrotoxic medications  F/u Urine electrolytes  Nephrology Pagan consulted. Maintain normal hydration s/p 500 ml NS bolus at ED  Continue gentle iv hydration. Prevent DVT Heparin SQ for DVT prophylaxis.

## 2020-04-05 NOTE — CONSULT NOTE ADULT - ASSESSMENT
MARGO possible pre renal due to dehydration, reduced po intake and hyperglycemia.  No glycosuria  Possible UTI , placed on Rocephin.    CKD due to Hypertension and possible diabetes  Hyperkalemia  Non anion gap MA    Follow 2 gm K diet IV fluids.  Sodium bicarbonate PO  Daily lab  Follow intake and out and urine culture results.

## 2020-04-05 NOTE — CONSULT NOTE ADULT - SUBJECTIVE AND OBJECTIVE BOX
PULMONARY CONSULT NOTE      ARIANNE WATERS  MRN-[id number]    Patient is a 88y old  Female who presents with a chief complaint of Hyperglycemia, weakness (2020 10:11)     History of Present Illness:  Reason for Admission: Hyperglycemia, weakness	  History of Present Illness: 	  Pt is and 89 y/o F, non verbal, mostly bedbound, from home, with PMH  of Dementia, DM and HTN was brought by son for hyperglycemia, generalized weakness and anorexia. According to the son, since 5 days, she has become weak and has not been eating well. He mentioned that today afternoon he measured her blood glucose which was more than 400 and he decided to bring her to hospital. As per son pt was not coughing, short of breath and did not have nausea, vomiting and other complains. He stated that pt used to walk with assistance before few weeks but recently has become bedbound and unable to walk with assistance.     HISTORY OF PRESENT ILLNESS: As above , awake ,alert , lying in bed in NAD.     MEDICATIONS  (STANDING):  ascorbic acid 1000 milliGRAM(s) Oral four times a day  azithromycin   Tablet 500 milliGRAM(s) Oral once  cefTRIAXone   IVPB 1000 milliGRAM(s) IV Intermittent every 24 hours  dextrose 5%. 1000 milliLiter(s) (50 mL/Hr) IV Continuous <Continuous>  dextrose 50% Injectable 12.5 Gram(s) IV Push once  dextrose 50% Injectable 25 Gram(s) IV Push once  dextrose 50% Injectable 25 Gram(s) IV Push once  heparin  Injectable 5000 Unit(s) SubCutaneous every 12 hours  hydroxychloroquine 400 milliGRAM(s) Oral every 12 hours  hydroxychloroquine   Oral   insulin glargine Injectable (LANTUS) 10 Unit(s) SubCutaneous at bedtime  insulin lispro (HumaLOG) corrective regimen sliding scale   SubCutaneous three times a day before meals  insulin lispro Injectable (HumaLOG) 5 Unit(s) SubCutaneous three times a day before meals  sodium chloride 0.45%. 1000 milliLiter(s) (60 mL/Hr) IV Continuous <Continuous>  thiamine 200 milliGRAM(s) Oral daily      MEDICATIONS  (PRN):  dextrose 40% Gel 15 Gram(s) Oral once PRN Blood Glucose LESS THAN 70 milliGRAM(s)/deciliter  glucagon  Injectable 1 milliGRAM(s) IntraMuscular once PRN Glucose LESS THAN 70 milligrams/deciliter      Allergies    No Known Allergies    Intolerances        PAST MEDICAL & SURGICAL HISTORY:  HTN (hypertension)  Diabetes mellitus  Dementia  No significant past surgical history      FAMILY HISTORY:  No pertinent family history in first degree relatives      SOCIAL HISTORY  Smoking History:     REVIEW OF SYSTEMS:    CONSTITUTIONAL:  No fevers, chills, sweats    HEENT:  Eyes:  No diplopia or blurred vision. ENT:  No earache, sore throat or runny nose.    CARDIOVASCULAR:  No pressure, squeezing, tightness, or heaviness about the chest; no palpitations.    RESPIRATORY:  Per HPI    GASTROINTESTINAL:  No abdominal pain, nausea, vomiting or diarrhea.    GENITOURINARY:  No dysuria, frequency or urgency.    NEUROLOGIC:  No paresthesias, fasciculations, seizures or weakness.    PSYCHIATRIC:  No disorder of thought or mood.    Vital Signs Last 24 Hrs  T(C): 37.1 (2020 08:37), Max: 37.1 (2020 08:37)  T(F): 98.8 (2020 08:37), Max: 98.8 (2020 08:37)  HR: 92 (2020 08:37) (88 - 102)  BP: 153/70 (2020 08:37) (143/46 - 187/73)  BP(mean): --  RR: 16 (2020 08:37) (16 - 18)  SpO2: 95% (2020 08:37) (92% - 100%)  I&O's Detail      PHYSICAL EXAMINATION:    GENERAL: The patient is a well-developed, well-nourished _____in no apparent distress.     HEENT: Head is normocephalic and atraumatic. Extraocular muscles are intact. Mucous membranes are moist.     NECK: Supple.     LUNGS: Clear to auscultation without wheezing, rales, or rhonchi. Respirations unlabored    HEART: Regular rate and rhythm without murmur.    ABDOMEN: Soft, nontender, and nondistended.  No hepatosplenomegaly is noted.    EXTREMITIES: Without any cyanosis, clubbing, rash, lesions or edema.    NEUROLOGIC: Grossly intact.      LABS:                        10.0   16.53 )-----------( 151      ( 2020 19:57 )             31.5     04-04    142  |  115<H>  |  85<H>  ----------------------------<  392<H>  5.4<H>   |  18<L>  |  4.18<H>    Ca    7.6<L>      2020 19:57    TPro  7.1  /  Alb  2.1<L>  /  TBili  0.3  /  DBili  x   /  AST  105<H>  /  ALT  51  /  AlkPhos  84  -      Urinalysis Basic - ( 2020 17:24 )    Color: Yellow / Appearance: Slightly Turbid / S.015 / pH: x  Gluc: x / Ketone: Negative  / Bili: Negative / Urobili: Negative   Blood: x / Protein: 100 / Nitrite: Negative   Leuk Esterase: Moderate / RBC: 5-10 /HPF / WBC >50 /HPF   Sq Epi: x / Non Sq Epi: Occasional /HPF / Bacteria: Many /HPF                      MICROBIOLOGY:    RADIOLOGY & ADDITIONAL STUDIES:    CXR:  < from: Xray Chest 1 View- PORTABLE-Urgent (20 @ 21:26) >    There is a patchy opacity of the right mid and right lower lung field likely a mixture of airspace disease and interstitial infiltrates. Mild interstitial prominence of the left lung base. Negative for pneumothorax or significant pleural effusion. Bony thorax is grossly intact.    IMPRESSION: Right greater than left pulmonary infiltrates.    < end of copied text >    Ct scan chest:    ekg;    echo: PULMONARY CONSULT NOTE      ARIANNE WATERS  MRN-[id number]    Patient is a 88y old  Female who presents with a chief complaint of Hyperglycemia, weakness (2020 10:11)     History of Present Illness:  Reason for Admission: Hyperglycemia, weakness	  History of Present Illness: 	  Pt is and 89 y/o F, non verbal, mostly bedbound, from home, with PMH  of Dementia, DM and HTN was brought by son for hyperglycemia, generalized weakness and anorexia. According to the son, since 5 days, she has become weak and has not been eating well. He mentioned that today afternoon he measured her blood glucose which was more than 400 and he decided to bring her to hospital. As per son pt was not coughing, short of breath and did not have nausea, vomiting and other complains. He stated that pt used to walk with assistance before few weeks but recently has become bedbound and unable to walk with assistance.     HISTORY OF PRESENT ILLNESS: As above.  Awake, alert, confused,  lying in bed in NAD.     MEDICATIONS  (STANDING):  ascorbic acid 1000 milliGRAM(s) Oral four times a day  azithromycin   Tablet 500 milliGRAM(s) Oral once  cefTRIAXone   IVPB 1000 milliGRAM(s) IV Intermittent every 24 hours  dextrose 5%. 1000 milliLiter(s) (50 mL/Hr) IV Continuous <Continuous>  dextrose 50% Injectable 12.5 Gram(s) IV Push once  dextrose 50% Injectable 25 Gram(s) IV Push once  dextrose 50% Injectable 25 Gram(s) IV Push once  heparin  Injectable 5000 Unit(s) SubCutaneous every 12 hours  hydroxychloroquine 400 milliGRAM(s) Oral every 12 hours  hydroxychloroquine   Oral   insulin glargine Injectable (LANTUS) 10 Unit(s) SubCutaneous at bedtime  insulin lispro (HumaLOG) corrective regimen sliding scale   SubCutaneous three times a day before meals  insulin lispro Injectable (HumaLOG) 5 Unit(s) SubCutaneous three times a day before meals  sodium chloride 0.45%. 1000 milliLiter(s) (60 mL/Hr) IV Continuous <Continuous>  thiamine 200 milliGRAM(s) Oral daily      MEDICATIONS  (PRN):  dextrose 40% Gel 15 Gram(s) Oral once PRN Blood Glucose LESS THAN 70 milliGRAM(s)/deciliter  glucagon  Injectable 1 milliGRAM(s) IntraMuscular once PRN Glucose LESS THAN 70 milligrams/deciliter      Allergies    No Known Allergies    Intolerances        PAST MEDICAL & SURGICAL HISTORY:  HTN (hypertension)  Diabetes mellitus  Dementia  No significant past surgical history      FAMILY HISTORY:  No pertinent family history in first degree relatives      SOCIAL HISTORY  Smoking History:     REVIEW OF SYSTEMS:    CONSTITUTIONAL:  No fevers, chills, sweats    HEENT:  Eyes:  No diplopia or blurred vision. ENT:  No earache, sore throat or runny nose.    CARDIOVASCULAR:  No pressure, squeezing, tightness, or heaviness about the chest; no palpitations.    RESPIRATORY:  Per HPI    GASTROINTESTINAL:  No abdominal pain, nausea, vomiting or diarrhea.    GENITOURINARY:  No dysuria, frequency or urgency.    NEUROLOGIC:  No paresthesias, fasciculations, seizures or weakness.    PSYCHIATRIC:  No disorder of thought or mood.    Vital Signs Last 24 Hrs  T(C): 37.1 (2020 08:37), Max: 37.1 (2020 08:37)  T(F): 98.8 (2020 08:37), Max: 98.8 (2020 08:37)  HR: 92 (2020 08:37) (88 - 102)  BP: 153/70 (2020 08:37) (143/46 - 187/73)  BP(mean): --  RR: 16 (2020 08:37) (16 - 18)  SpO2: 95% (2020 08:37) (92% - 100%)  I&O's Detail      PHYSICAL EXAMINATION:    GENERAL: The patient is a well-developed, well-nourished _____in no apparent distress.     HEENT: Head is normocephalic and atraumatic. Extraocular muscles are intact. Mucous membranes are moist.     NECK: Supple.     LUNGS: Clear to auscultation without wheezing, rales, or rhonchi. Respirations unlabored    HEART: Regular rate and rhythm without murmur.    ABDOMEN: Soft, nontender, and nondistended.  No hepatosplenomegaly is noted.    EXTREMITIES: Without any cyanosis, clubbing, rash, lesions or edema.    NEUROLOGIC: Grossly intact.      LABS:                        10.0   16.53 )-----------( 151      ( 2020 19:57 )             31.5     04-04    142  |  115<H>  |  85<H>  ----------------------------<  392<H>  5.4<H>   |  18<L>  |  4.18<H>    Ca    7.6<L>      2020 19:57    TPro  7.1  /  Alb  2.1<L>  /  TBili  0.3  /  DBili  x   /  AST  105<H>  /  ALT  51  /  AlkPhos  84  -      Urinalysis Basic - ( 2020 17:24 )    Color: Yellow / Appearance: Slightly Turbid / S.015 / pH: x  Gluc: x / Ketone: Negative  / Bili: Negative / Urobili: Negative   Blood: x / Protein: 100 / Nitrite: Negative   Leuk Esterase: Moderate / RBC: 5-10 /HPF / WBC >50 /HPF   Sq Epi: x / Non Sq Epi: Occasional /HPF / Bacteria: Many /HPF                      MICROBIOLOGY:    RADIOLOGY & ADDITIONAL STUDIES:    CXR:  < from: Xray Chest 1 View- PORTABLE-Urgent (20 @ 21:26) >    There is a patchy opacity of the right mid and right lower lung field likely a mixture of airspace disease and interstitial infiltrates. Mild interstitial prominence of the left lung base. Negative for pneumothorax or significant pleural effusion. Bony thorax is grossly intact.    IMPRESSION: Right greater than left pulmonary infiltrates.    < end of copied text >    Ct scan chest:    ekg;    echo:

## 2020-04-06 ENCOUNTER — OUTPATIENT (OUTPATIENT)
Dept: OUTPATIENT SERVICES | Facility: HOSPITAL | Age: 85
LOS: 1 days | End: 2020-04-06

## 2020-04-06 LAB — SARS-COV-2 RNA SPEC QL NAA+PROBE: DETECTED

## 2020-04-06 PROCEDURE — 82962 GLUCOSE BLOOD TEST: CPT

## 2020-04-06 PROCEDURE — 80061 LIPID PANEL: CPT

## 2020-04-06 PROCEDURE — 83036 HEMOGLOBIN GLYCOSYLATED A1C: CPT

## 2020-04-06 PROCEDURE — 83735 ASSAY OF MAGNESIUM: CPT

## 2020-04-06 PROCEDURE — 81001 URINALYSIS AUTO W/SCOPE: CPT

## 2020-04-06 PROCEDURE — 99285 EMERGENCY DEPT VISIT HI MDM: CPT | Mod: 25

## 2020-04-06 PROCEDURE — 71045 X-RAY EXAM CHEST 1 VIEW: CPT

## 2020-04-06 PROCEDURE — 83615 LACTATE (LD) (LDH) ENZYME: CPT

## 2020-04-06 PROCEDURE — 80053 COMPREHEN METABOLIC PANEL: CPT

## 2020-04-06 PROCEDURE — 87635 SARS-COV-2 COVID-19 AMP PRB: CPT

## 2020-04-06 PROCEDURE — 84443 ASSAY THYROID STIM HORMONE: CPT

## 2020-04-06 PROCEDURE — 82607 VITAMIN B-12: CPT

## 2020-04-06 PROCEDURE — 85379 FIBRIN DEGRADATION QUANT: CPT

## 2020-04-06 PROCEDURE — 84100 ASSAY OF PHOSPHORUS: CPT

## 2020-04-06 PROCEDURE — 84145 PROCALCITONIN (PCT): CPT

## 2020-04-06 PROCEDURE — 93005 ELECTROCARDIOGRAM TRACING: CPT

## 2020-04-06 PROCEDURE — 36415 COLL VENOUS BLD VENIPUNCTURE: CPT

## 2020-04-06 PROCEDURE — 85027 COMPLETE CBC AUTOMATED: CPT

## 2020-04-06 PROCEDURE — 86140 C-REACTIVE PROTEIN: CPT

## 2020-04-06 PROCEDURE — 82306 VITAMIN D 25 HYDROXY: CPT

## 2020-04-06 NOTE — PROGRESS NOTE ADULT - SUBJECTIVE AND OBJECTIVE BOX
Patient is a 88y old  Female who presents with a chief complaint of Hyperglycemia, weakness (05 Apr 2020 16:28)    pt seen in ed [x  ], reg med floor [   ], bed [ x ], chair at bedside [   ], awake and responsive [x  ], lethargic [  ],    nad [x  ]      Allergies    No Known Allergies        Vitals    T(F): 99 (04-05-20 @ 16:23), Max: 99 (04-05-20 @ 16:23)  HR: 92 (04-05-20 @ 16:23) (88 - 92)  BP: 158/68 (04-05-20 @ 16:23) (150/49 - 158/68)  RR: 18 (04-05-20 @ 16:23) (16 - 18)  SpO2: 95% (04-05-20 @ 16:23) (94% - 95%)  Wt(kg): --  CAPILLARY BLOOD GLUCOSE      POCT Blood Glucose.: 111 mg/dL (05 Apr 2020 16:13)      Labs                          10.9   15.77 )-----------( 175      ( 05 Apr 2020 12:49 )             34.6       04-05    148<H>  |  118<H>  |  72<H>  ----------------------------<  191<H>  4.7   |  23  |  3.11<H>    Ca    8.7      05 Apr 2020 12:49  Phos  3.7     04-05  Mg     3.7     04-05    TPro  7.9  /  Alb  2.2<L>  /  TBili  0.2  /  DBili  x   /  AST  91<H>  /  ALT  51  /  AlkPhos  93  04-05                Radiology Results      Meds    MEDICATIONS  (STANDING):  ascorbic acid 1000 milliGRAM(s) Oral four times a day  azithromycin   Tablet 500 milliGRAM(s) Oral once  azithromycin   Tablet 250 milliGRAM(s) Oral daily  cefTRIAXone   IVPB 1000 milliGRAM(s) IV Intermittent every 24 hours  dextrose 5%. 1000 milliLiter(s) (50 mL/Hr) IV Continuous <Continuous>  dextrose 50% Injectable 12.5 Gram(s) IV Push once  dextrose 50% Injectable 25 Gram(s) IV Push once  dextrose 50% Injectable 25 Gram(s) IV Push once  heparin  Injectable 5000 Unit(s) SubCutaneous every 12 hours  hydroxychloroquine 400 milliGRAM(s) Oral every 12 hours  hydroxychloroquine 200 milliGRAM(s) Oral every 12 hours  hydroxychloroquine   Oral   insulin glargine Injectable (LANTUS) 10 Unit(s) SubCutaneous at bedtime  insulin lispro (HumaLOG) corrective regimen sliding scale   SubCutaneous three times a day before meals  insulin lispro Injectable (HumaLOG) 5 Unit(s) SubCutaneous three times a day before meals  sodium chloride 0.45%. 1000 milliLiter(s) (60 mL/Hr) IV Continuous <Continuous>  thiamine 200 milliGRAM(s) Oral daily      MEDICATIONS  (PRN):  dextrose 40% Gel 15 Gram(s) Oral once PRN Blood Glucose LESS THAN 70 milliGRAM(s)/deciliter  glucagon  Injectable 1 milliGRAM(s) IntraMuscular once PRN Glucose LESS THAN 70 milligrams/deciliter      Physical Exam    Neuro :  no focal deficits  Respiratory: CTA B/L  CV: RRR, S1S2, no murmurs,   Abdominal: Soft, NT, ND +BS,  Extremities: No edema, + peripheral pulses    ASSESSMENT    Hyperglycemia 2nd to uncontrolled dm  uti  b/l pna   r/o covid-19   acute on ckd  h/o HTN (hypertension)  Diabetes mellitus  Dementia  ckd      PLAN        cont lantus  add humalog 5units actid  cont hss  f/u [id number]  f/u ucx  cont roceph and zith  cxr with Right greater than left pulmonary infiltrates noted above  pulm cons   cont albuterol inhaler  start plaquenil   afebrile,   tmx 98.5   O2 sat on ra 100% ( range 92% - 100%)   O2 via nasal canula if needed  f/u covid test  contact and airborne isolation  f/u procalcitonin, legionella Ag, strep, mycoplasma Ag  F/u aptt, inr, D-dimer, esr, crp, ldh, ferritin, lactate, t cell subset  cont supportive care with tylenol prn, robitussin prn   kayexalate for hyperkalemia  renal cons  cont ivf  cont current meds

## 2020-04-07 ENCOUNTER — OUTPATIENT (OUTPATIENT)
Dept: OUTPATIENT SERVICES | Facility: HOSPITAL | Age: 85
LOS: 1 days | End: 2020-04-07

## 2020-04-07 DIAGNOSIS — Z71.89 OTHER SPECIFIED COUNSELING: ICD-10-CM

## 2020-04-08 ENCOUNTER — OUTPATIENT (OUTPATIENT)
Dept: OUTPATIENT SERVICES | Facility: HOSPITAL | Age: 85
LOS: 1 days | End: 2020-04-08

## 2020-04-09 ENCOUNTER — OUTPATIENT (OUTPATIENT)
Dept: OUTPATIENT SERVICES | Facility: HOSPITAL | Age: 85
LOS: 1 days | End: 2020-04-09

## 2020-04-10 ENCOUNTER — OUTPATIENT (OUTPATIENT)
Dept: OUTPATIENT SERVICES | Facility: HOSPITAL | Age: 85
LOS: 1 days | End: 2020-04-10

## 2022-07-05 NOTE — ED PROVIDER NOTE - CARE PLAN
Pt. C/o fever x 3 days and all over body pain. Pt. Reports hx of lupus.  
Principal Discharge DX:	Rib fracture  Secondary Diagnosis:	Compression fracture of thoracic vertebra

## 2023-03-01 NOTE — ED ADULT TRIAGE NOTE - HEIGHT IN FEET
Procedure:  PROCTOCOLECTOMY TOTAL W ILEO ANAL POUCH, diverting loop ileostomy (Abdomen)    Relevant Problems   ENDO   (+) Acquired hypothyroidism      /RENAL   (+) Acute renal failure (ARF) (HCC)      MUSCULOSKELETAL   (+) Low back pain at multiple sites   (+) Primary osteoarthritis of both knees      PULMONARY   (+) Mild intermittent asthma without complication   (+) Mild persistent asthma without complication        Physical Exam    Airway    Mallampati score: II  TM Distance: <3 FB  Neck ROM: limited     Dental       Cardiovascular  Cardiovascular exam normal    Pulmonary  Pulmonary exam normal     Other Findings        Anesthesia Plan  ASA Score- 3     Anesthesia Type- general with ASA Monitors  Additional Monitors:   Airway Plan: ETT  Comment: Eulalio for intubation  Plan Factors-Exercise tolerance (METS): >4 METS  Chart reviewed  EKG reviewed  Existing labs reviewed  Patient summary reviewed  Patient is not a current smoker  Induction- intravenous  Postoperative Plan- Plan for postoperative opioid use  Planned trial extubation    Informed Consent- Anesthetic plan and risks discussed with patient  I personally reviewed this patient with the CRNA  Discussed and agreed on the Anesthesia Plan with the CRNA  Roanna Schlatter
5

## 2024-02-08 NOTE — ED PROVIDER NOTE - ATTESTATION, MLM
"/40   Pulse 57   Temp 96.8  F (36  C) (Tympanic)   Resp 13   Ht 1.549 m (5' 1\")   Wt 64.8 kg (142 lb 12.8 oz)   SpO2 93%   BMI 26.98 kg/m      " I have reviewed and confirmed nurses' notes for patient's medications, allergies, medical history, and surgical history.

## 2025-03-15 NOTE — ED ADULT NURSE NOTE - PATIENT/CAREGIVER OFFERED  INTERPRETER SERVICES
Interval History:  Patient complaining of headache this morning.  Also reports having right groin pains, possibly spasms.  Sodium level improving    Review of Systems  Objective:     Vital Signs (Most Recent):  Temp: 97.5 °F (36.4 °C) (03/15/25 0931)  Pulse:  (given per family) (03/15/25 0900)  Resp:  (given per family , imntructed to wait for nurse) (03/15/25 0900)  BP: (!) 152/74 (03/15/25 0931)  SpO2: 96 % (03/15/25 0931) Vital Signs (24h Range):  Temp:  [97.5 °F (36.4 °C)] 97.5 °F (36.4 °C)  Pulse:  [56-82] 82  Resp:  [18] 18  SpO2:  [96 %-98 %] 96 %  BP: (146-161)/(66-74) 152/74     Weight: 59 kg (130 lb 1.1 oz)  Body mass index is 23.04 kg/m².    Intake/Output Summary (Last 24 hours) at 3/15/2025 1139  Last data filed at 3/15/2025 0602  Gross per 24 hour   Intake --   Output 3150 ml   Net -3150 ml         Physical Exam      Clear lungs bilaterally, unlabored breathing, on room air, no cyanosis   Heart sounds indicate a regular rate and rhythm   No facial droop, no slurred speech   C-collar in place   5/5 fist  plantar flexion of both feet   No obvious lower extremity edema   Intact straight leg raise on the right   yes